# Patient Record
Sex: FEMALE | Race: WHITE | NOT HISPANIC OR LATINO | URBAN - NONMETROPOLITAN AREA
[De-identification: names, ages, dates, MRNs, and addresses within clinical notes are randomized per-mention and may not be internally consistent; named-entity substitution may affect disease eponyms.]

---

## 2021-07-01 ENCOUNTER — IMPORTED ENCOUNTER (OUTPATIENT)
Dept: URBAN - NONMETROPOLITAN AREA CLINIC 1 | Facility: CLINIC | Age: 71
End: 2021-07-01

## 2021-07-01 PROBLEM — H52.4: Noted: 2021-07-01

## 2021-07-01 PROBLEM — H25.813: Noted: 2021-07-01

## 2021-07-01 PROBLEM — H43.813: Noted: 2021-07-01

## 2021-07-01 PROBLEM — H35.3132: Noted: 2021-07-01

## 2021-07-01 PROBLEM — H52.13: Noted: 2021-07-01

## 2021-07-01 PROBLEM — H10.413: Noted: 2021-07-01

## 2021-07-01 PROCEDURE — 92015 DETERMINE REFRACTIVE STATE: CPT

## 2021-07-01 PROCEDURE — 92134 CPTRZ OPH DX IMG PST SGM RTA: CPT

## 2021-07-01 PROCEDURE — 99204 OFFICE O/P NEW MOD 45 MIN: CPT

## 2021-07-01 NOTE — PATIENT DISCUSSION
Cataract(s)-Visually significant.-Cataract(s) causing symptomatic impairment of visual function not correctable with a tolerable change in glasses or contact lenses lighting or non-operative means resulting in specific activity limitations and/or participation restrictions including but not limited to reading viewing television driving or meeting vocational or recreational needs. -Expectation is clearer vision and reduced glare disability after cataract removal.-Refer to Dr Evan Wolfe for cataract evaluation pt will try steroid at this time PVD-Retina flat 360 with no breaks tears or heme.-S&S of RD/RT reviewed with pt.-Stressed that pt should contact our office right away with any changes or increase in symptoms. AMD - dry-Explained dry AMD and advised that there are no treatments available at this time.-Continue AREDS 2 MVT. -Pt is to contact our office if any changes are noted. -Order OCT MAC today performed and reviewed w/pt GPC Discussed in detail w/pt discussed steroid tx but would only help while using steroiddiscussed cataract surgery in hopes of not having to use hard lens afterpt elects to try steroid tx for 3 wks discussed punctal occlusion to reduce amt of steroid absorbed by the bodySTART prednisolone tid OD until seen written Rx given Instructed pt to instill drops while CL is out recommend pt reduce wearing time; Dr's Notes: Jailene

## 2021-07-30 ENCOUNTER — IMPORTED ENCOUNTER (OUTPATIENT)
Dept: URBAN - NONMETROPOLITAN AREA CLINIC 1 | Facility: CLINIC | Age: 71
End: 2021-07-30

## 2021-07-30 PROBLEM — H10.413: Noted: 2021-07-30

## 2021-07-30 PROBLEM — H43.813: Noted: 2021-07-30

## 2021-07-30 PROBLEM — H25.813: Noted: 2021-07-30

## 2021-07-30 PROBLEM — H52.4: Noted: 2021-07-01

## 2021-07-30 PROBLEM — H35.3132: Noted: 2021-07-30

## 2021-07-30 PROBLEM — H52.13: Noted: 2021-07-01

## 2021-07-30 PROCEDURE — 99213 OFFICE O/P EST LOW 20 MIN: CPT

## 2021-07-30 NOTE — PATIENT DISCUSSION
GPC-Symptomatic improvement but little improvement in clinical picture-Long discussion about short term vs long term management-Can continue PRED BID OD QD OS (refills sent)-Can refit CL but not a long term solution-Best to address visual complaints with cataract surgery and get out of RGPsCataract(s)-Visually significant.-Cataract(s) causing symptomatic impairment of visual function not correctable with a tolerable change in glasses or contact lenses lighting or non-operative means resulting in specific activity limitations and/or participation restrictions including but not limited to reading viewing television driving or meeting vocational or recreational needs. -Expectation is clearer vision and reduced glare disability after cataract removal.-Refer to Dr Dilan Beck for cataract evaluationPVD-Retina flat 360 with no breaks tears or heme.-S&S of RD/RT reviewed with pt.-Stressed that pt should contact our office right away with any changes or increase in symptoms. AMD - dry-Explained dry AMD and advised that there are no treatments available at this time.-Continue AREDS 2 MVT. -Pt is to contact our office if any changes are noted.; 's Notes: Jailene

## 2021-09-24 ENCOUNTER — IMPORTED ENCOUNTER (OUTPATIENT)
Dept: URBAN - NONMETROPOLITAN AREA CLINIC 1 | Facility: CLINIC | Age: 71
End: 2021-09-24

## 2021-09-24 PROBLEM — H25.813: Noted: 2021-09-24

## 2021-09-24 PROBLEM — H43.813: Noted: 2021-09-24

## 2021-09-24 PROBLEM — H35.3132: Noted: 2021-09-24

## 2021-09-24 PROCEDURE — 99214 OFFICE O/P EST MOD 30 MIN: CPT

## 2021-09-24 NOTE — PATIENT DISCUSSION
Cataract(s)-Visually significant cataract OU .-Cataract(s) causing symptomatic impairment of visual function not correctable with a tolerable change in glasses or contact lenses lighting or non-operative means resulting in specific activity limitations and/or participation restrictions including but not limited to reading viewing television driving or meeting vocational or recreational needs. -Expectation is clearer vision and functional improvement in symptoms as well as reduced glare disability after cataract removal.-Order IOLMaster and OPD today. -Recommend Vivity Toric OU  based on today's OPD testing and lifestyle questionnaire.-All questions were answered regarding surgery including pre and post-op medications appointments activity restrictions and anesthetic usage.-The risks benefits and alternatives and special risk factors for the patient were discussed in detail including but not limited to: bleeding infection retinal detachment vitreous loss problems with the implant and possible need for additional surgery.-Although rare the possibility of complete vision loss was discussed.-The possible need for glasses post-operatively was discussed.-Order medical clearance exam based on history of allergies & pts age -Patient elects to proceed with cataract surgery OD . Will schedule at patient's convenience and re-evaluate OS  in the future. Discussed lens today Qualifies for Toric OU and elects Vivity. Discussed the [address] @ distance near and intermediate. Explained need for mild otc readers @ time. Pt elects LenSx OU Post op inflammation anticipated discussed dextenza insertion after surgery. 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 No treatment needed @ this time. She expressed understanding.  Continue to monitor for now.; 's Notes: Jailene

## 2021-10-11 ENCOUNTER — IMPORTED ENCOUNTER (OUTPATIENT)
Dept: URBAN - NONMETROPOLITAN AREA CLINIC 1 | Facility: CLINIC | Age: 71
End: 2021-10-11

## 2021-10-11 PROBLEM — H43.813: Noted: 2021-10-11

## 2021-10-11 PROBLEM — Z01.818: Noted: 2021-10-11

## 2021-10-11 PROBLEM — H25.813: Noted: 2021-10-11

## 2021-10-11 PROBLEM — H35.3132: Noted: 2021-10-11

## 2021-10-26 PROBLEM — H43.813: Noted: 2021-10-26

## 2021-10-26 PROBLEM — H35.3132: Noted: 2021-10-26

## 2021-10-26 PROBLEM — H25.813: Noted: 2021-10-26

## 2021-10-26 PROBLEM — Z01.818: Noted: 2021-10-26

## 2021-11-09 ENCOUNTER — IMPORTED ENCOUNTER (OUTPATIENT)
Dept: URBAN - NONMETROPOLITAN AREA CLINIC 1 | Facility: CLINIC | Age: 71
End: 2021-11-09

## 2021-11-09 PROBLEM — Z01.818: Noted: 2021-11-15

## 2021-11-09 PROBLEM — Z98.41: Noted: 2021-11-09

## 2021-11-09 PROBLEM — H43.813: Noted: 2021-10-11

## 2021-11-09 PROBLEM — H35.3132: Noted: 2021-10-11

## 2021-11-09 PROBLEM — I10: Noted: 2021-11-15

## 2021-11-09 PROBLEM — H25.812: Noted: 2021-11-09

## 2021-11-09 PROCEDURE — 99024 POSTOP FOLLOW-UP VISIT: CPT

## 2021-11-09 NOTE — PATIENT DISCUSSION
s/p PCIOL Vivity Toric/LenSx IOL OD 11/8/21 -JS-No dextenza will script PRED 1% on a 4 week taper-Pt doing well s/p PCIOL. -Continue post-op gtts according to instruction sheet and sleep with eye shield over eye for 7 nights.-Avoid bending at the waist lifting anything over 5lbs and dirty or sara environments. -RTC 1 wk POV OD/Reeval OS with PVM . ; 's Notes: Jailene

## 2021-11-15 ENCOUNTER — IMPORTED ENCOUNTER (OUTPATIENT)
Dept: URBAN - NONMETROPOLITAN AREA CLINIC 1 | Facility: CLINIC | Age: 71
End: 2021-11-15

## 2021-11-15 PROCEDURE — 99024 POSTOP FOLLOW-UP VISIT: CPT

## 2021-11-15 NOTE — PATIENT DISCUSSION
Cataract(s)-Visually significant cataract OS . -Cataract(s) causing symptomatic impairment of visual function not correctable with a tolerable change in glasses or contact lenses lighting or non-operative means resulting in specific activity limitations and/or participation restrictions including but not limited to reading viewing television driving or meeting vocational or recreational needs. -Expectation is clearer vision and functional improvement in symptoms as well as reduced glare disability after cataract removal.-Recommend Vivity Toric IOL /Lensx based on previous OPD testing and lifestyle questionnaire.-All questions were answered regarding surgery including pre and post-op medications appointments activity restrictions and anesthetic usage.-The risks benefits and alternatives and special risk factors for the patient were discussed in detail including but not limited to: bleeding infection retinal detachment vitreous loss problems with the implant and possible need for additional surgery.-Although rare the possibility of complete vision loss was discussed.-The need for glasses post-operatively was discussed.-Patient elects to proceed with cataract surgery OS . -START refresh relieva QID OS until return-Need Ian OS s/p PCIOL-Pt doing well at 1 week s/p PCIOL. -Continue post-op gtts according to instruction sheet.-Okay to resume usual activites and d/c eye shield.; 's Notes: Jailene

## 2021-11-15 NOTE — PATIENT DISCUSSION
Medical Clearance-Medical clearance done today. -No outstanding concerns that would preclude surgery.-Patient is cleared to proceed with scheduled surgery.; 's Notes: Jailene

## 2021-12-06 ENCOUNTER — IMPORTED ENCOUNTER (OUTPATIENT)
Dept: URBAN - NONMETROPOLITAN AREA CLINIC 1 | Facility: CLINIC | Age: 71
End: 2021-12-06

## 2021-12-06 NOTE — PATIENT DISCUSSION
Cataract(s)-Visually significant cataract OS . -Cataract(s) causing symptomatic impairment of visual function not correctable with a tolerable change in glasses or contact lenses lighting or non-operative means resulting in specific activity limitations and/or participation restrictions including but not limited to reading viewing television driving or meeting vocational or recreational needs. -Expectation is clearer vision and functional improvement in symptoms as well as reduced glare disability after cataract removal.-Recommend Vivity Toric IOL /Lensx based on previous OPD testing and lifestyle questionnaire.-All questions were answered regarding surgery including pre and post-op medications appointments activity restrictions and anesthetic usage.-The risks benefits and alternatives and special risk factors for the patient were discussed in detail including but not limited to: bleeding infection retinal detachment vitreous loss problems with the implant and possible need for additional surgery.-Although rare the possibility of complete vision loss was discussed.-The need for glasses post-operatively was discussed.-Patient elects to proceed with cataract surgery OS . -START refresh relieva QID OS until returns/p PCIOL-Pt doing well at 1 week s/p PCIOL. -Continue post-op gtts according to instruction sheet.-Okay to resume usual activites and d/c eye shield.; 's Notes: Jailene

## 2021-12-14 ENCOUNTER — IMPORTED ENCOUNTER (OUTPATIENT)
Dept: URBAN - NONMETROPOLITAN AREA CLINIC 1 | Facility: CLINIC | Age: 71
End: 2021-12-14

## 2021-12-14 PROBLEM — Z96.1: Noted: 2022-01-05

## 2021-12-14 PROBLEM — Z98.41: Noted: 2021-12-14

## 2021-12-14 PROBLEM — H35.3132: Noted: 2021-10-11

## 2021-12-14 PROBLEM — H43.813: Noted: 2021-10-11

## 2021-12-14 PROBLEM — H26.493: Noted: 2022-01-05

## 2021-12-14 PROBLEM — Z98.42: Noted: 2021-12-14

## 2021-12-14 PROCEDURE — 99024 POSTOP FOLLOW-UP VISIT: CPT

## 2021-12-14 NOTE — PATIENT DISCUSSION
s/p PCIOL OS-Pt doing well s/p PCIOL. -Continue post-op gtts according to instruction sheet and sleep with eye shield over eye for 7 nights.-Avoid bending at the waist lifting anything over 5lbs and dirty or sara environments. -RTC 1 week POVs/p PCIOL OD-Pt doing well at 3 weeks s/p PCIOL. -Continue post-op gtts according to instruction sheet.; 's Notes: Giuliano Tran

## 2021-12-20 ENCOUNTER — IMPORTED ENCOUNTER (OUTPATIENT)
Dept: URBAN - NONMETROPOLITAN AREA CLINIC 1 | Facility: CLINIC | Age: 71
End: 2021-12-20

## 2021-12-20 PROCEDURE — 99024 POSTOP FOLLOW-UP VISIT: CPT

## 2021-12-20 NOTE — PATIENT DISCUSSION
s/p PCIOL OS-Pt doing well s/p PCIOL. -Continue post-op gtts according to instruction sheet-RTC 2 week POV w/refraction-Can consider VUITYs/p PCIOL OD-Pt doing well at 4 weeks s/p PCIOL. -Continue post-op gtts according to instruction sheet. AMD/PVD OU-Will need long term monitoringA/R TODAY:  -0.25+0.50 x 153           -1.00+0.50 x 001; 's Notes: Jailene

## 2022-01-05 ENCOUNTER — PREPPED CHART (OUTPATIENT)
Dept: RURAL CLINIC 2 | Facility: CLINIC | Age: 72
End: 2022-01-05

## 2022-01-05 ENCOUNTER — IMPORTED ENCOUNTER (OUTPATIENT)
Dept: URBAN - NONMETROPOLITAN AREA CLINIC 1 | Facility: CLINIC | Age: 72
End: 2022-01-05

## 2022-01-05 PROCEDURE — 99024 POSTOP FOLLOW-UP VISIT: CPT

## 2022-01-05 NOTE — PATIENT DISCUSSION
s/p PCIOL OS-Pt doing well s/p PCIOL. -Continue post-op gtts according to instruction sheet-Continue OTC readers PRN s/p PCIOL OD-Pt doing well s/p PCIOL. AMD/PVD OU-Order OCT MAC at next visit; 's Notes: Connie Mcneil

## 2022-04-06 ASSESSMENT — VISUAL ACUITY
OD_SC: 20/20
OS_SC: 20/30-2

## 2022-04-06 ASSESSMENT — TONOMETRY
OS_IOP_MMHG: 15
OD_IOP_MMHG: 15

## 2022-04-07 ENCOUNTER — ESTABLISHED PATIENT (OUTPATIENT)
Dept: RURAL CLINIC 2 | Facility: CLINIC | Age: 72
End: 2022-04-07

## 2022-04-07 DIAGNOSIS — H26.493: ICD-10-CM

## 2022-04-07 DIAGNOSIS — H35.3132: ICD-10-CM

## 2022-04-07 DIAGNOSIS — Z96.1: ICD-10-CM

## 2022-04-07 DIAGNOSIS — H43.813: ICD-10-CM

## 2022-04-07 DIAGNOSIS — H35.372: ICD-10-CM

## 2022-04-07 PROCEDURE — 99214 OFFICE O/P EST MOD 30 MIN: CPT

## 2022-04-07 PROCEDURE — 92134 CPTRZ OPH DX IMG PST SGM RTA: CPT

## 2022-04-07 ASSESSMENT — TONOMETRY
OD_IOP_MMHG: 17
OS_IOP_MMHG: 15

## 2022-04-07 ASSESSMENT — VISUAL ACUITY
OS_PH: 20/30+3
OD_SC: 20/20
OS_SC: 20/40

## 2022-04-15 ASSESSMENT — VISUAL ACUITY
OS_CC: 20/80-2
OS_CC: 20/40-1
OS_CC: 20/40+2
OD_SC: 20/100
OD_CC: 20/20
OD_PH: 20/30
OS_SC: 20/30
OS_AM: 20/20
OS_GLARE: 20/30
OS_SC: 20/30-1
OD_SC: 20/40-2
OS_CC: 20/40+3
OD_SC: 20/200
OS_AM: 20/25
OU_CC: 20/30
OS_GLARE: 20/200
OS_CC: 20/30-2
OS_CC: 20/100
OD_PAM: 20/20
OS_SC: 20/30
OS_PH: 20/40-1
OD_SC: 20/100-1
OD_GLARE: 20/40
OD_SC: 20/40
OD_PH: 20/30
OS_CC: 20/200
OS_GLARE: 20/100
OD_CC: 20/25-2
OD_CC: 20/20-2
OS_SC: 20/30-2
OS_CC: 20/100
OS_PH: 20/20
OU_SC: 20/30
OD_PH: 20/30+2
OD_CC: 20/20-1
OU_SC: 20/25
OD_CC: 20/20
OD_CC: 20/30-1
OS_PH: 20/60-1
OU_CC: 20/80
OD_SC: 20/100
OU_CC: 20/40-1
OS_SC: 20/50
OS_AM: 20/20
OD_CC: 20/25-2

## 2022-04-15 ASSESSMENT — TONOMETRY
OD_IOP_MMHG: 16
OS_IOP_MMHG: 16
OS_IOP_MMHG: 15
OS_IOP_MMHG: 15
OD_IOP_MMHG: 15
OD_IOP_MMHG: 15
OS_IOP_MMHG: 16
OD_IOP_MMHG: 17
OS_IOP_MMHG: 15
OS_IOP_MMHG: 16
OS_IOP_MMHG: 17
OS_IOP_MMHG: 16
OS_IOP_MMHG: 15
OD_IOP_MMHG: 15
OD_IOP_MMHG: 16
OD_IOP_MMHG: 17

## 2022-11-16 ENCOUNTER — ESTABLISHED PATIENT (OUTPATIENT)
Dept: RURAL CLINIC 2 | Facility: CLINIC | Age: 72
End: 2022-11-16

## 2022-11-16 DIAGNOSIS — Z96.1: ICD-10-CM

## 2022-11-16 DIAGNOSIS — H35.372: ICD-10-CM

## 2022-11-16 DIAGNOSIS — H43.813: ICD-10-CM

## 2022-11-16 DIAGNOSIS — H35.3132: ICD-10-CM

## 2022-11-16 DIAGNOSIS — H26.493: ICD-10-CM

## 2022-11-16 PROCEDURE — 92014 COMPRE OPH EXAM EST PT 1/>: CPT

## 2022-11-16 PROCEDURE — 92134 CPTRZ OPH DX IMG PST SGM RTA: CPT

## 2022-11-16 ASSESSMENT — TONOMETRY
OS_IOP_MMHG: 15
OD_IOP_MMHG: 17

## 2022-11-16 ASSESSMENT — VISUAL ACUITY
OD_SC: 20/25
OS_PH: 20/25
OS_SC: 20/40

## 2024-01-04 ENCOUNTER — ESTABLISHED PATIENT (OUTPATIENT)
Dept: RURAL CLINIC 2 | Facility: CLINIC | Age: 74
End: 2024-01-04

## 2024-01-04 DIAGNOSIS — H43.813: ICD-10-CM

## 2024-01-04 DIAGNOSIS — Z96.1: ICD-10-CM

## 2024-01-04 DIAGNOSIS — H35.372: ICD-10-CM

## 2024-01-04 DIAGNOSIS — H26.493: ICD-10-CM

## 2024-01-04 DIAGNOSIS — H52.4: ICD-10-CM

## 2024-01-04 DIAGNOSIS — H35.3132: ICD-10-CM

## 2024-01-04 PROCEDURE — 99214 OFFICE O/P EST MOD 30 MIN: CPT

## 2024-01-04 PROCEDURE — 92015 DETERMINE REFRACTIVE STATE: CPT

## 2024-01-04 PROCEDURE — 92134 CPTRZ OPH DX IMG PST SGM RTA: CPT

## 2024-01-04 ASSESSMENT — TONOMETRY
OD_IOP_MMHG: 17
OS_IOP_MMHG: 17

## 2024-01-04 ASSESSMENT — VISUAL ACUITY
OD_SC: 20/40+
OD_PH: 20/30
OS_SC: 20/70
OS_PH: 20/30+

## 2024-07-22 ENCOUNTER — EMERGENCY VISIT (OUTPATIENT)
Dept: RURAL CLINIC 2 | Facility: CLINIC | Age: 74
End: 2024-07-22

## 2024-07-22 DIAGNOSIS — H26.493: ICD-10-CM

## 2024-07-22 DIAGNOSIS — Z96.1: ICD-10-CM

## 2024-07-22 DIAGNOSIS — H16.142: ICD-10-CM

## 2024-07-22 DIAGNOSIS — H35.372: ICD-10-CM

## 2024-07-22 DIAGNOSIS — H35.3132: ICD-10-CM

## 2024-07-22 DIAGNOSIS — H43.813: ICD-10-CM

## 2024-07-22 PROCEDURE — 92285 EXTERNAL OCULAR PHOTOGRAPHY: CPT

## 2024-07-22 PROCEDURE — 99213 OFFICE O/P EST LOW 20 MIN: CPT

## 2024-07-22 ASSESSMENT — VISUAL ACUITY
OD_SC: 20/25+
OS_SC: 20/80
OS_PH: 20/30+

## 2024-07-25 ENCOUNTER — FOLLOW UP (OUTPATIENT)
Dept: RURAL CLINIC 2 | Facility: CLINIC | Age: 74
End: 2024-07-25

## 2024-07-25 DIAGNOSIS — H16.142: ICD-10-CM

## 2024-07-25 DIAGNOSIS — H35.372: ICD-10-CM

## 2024-07-25 DIAGNOSIS — H43.813: ICD-10-CM

## 2024-07-25 DIAGNOSIS — H26.493: ICD-10-CM

## 2024-07-25 DIAGNOSIS — Z96.1: ICD-10-CM

## 2024-07-25 DIAGNOSIS — H35.3132: ICD-10-CM

## 2024-07-25 PROCEDURE — 99213 OFFICE O/P EST LOW 20 MIN: CPT

## 2024-07-25 ASSESSMENT — VISUAL ACUITY
OD_SC: 20/25-1
OS_PH: 20/40
OS_SC: 20/70

## 2024-07-25 ASSESSMENT — TONOMETRY
OS_IOP_MMHG: 16
OD_IOP_MMHG: 16

## 2025-01-09 ENCOUNTER — COMPREHENSIVE EXAM (OUTPATIENT)
Age: 75
End: 2025-01-09

## 2025-01-09 DIAGNOSIS — H43.813: ICD-10-CM

## 2025-01-09 DIAGNOSIS — H35.3132: ICD-10-CM

## 2025-01-09 DIAGNOSIS — H16.142: ICD-10-CM

## 2025-01-09 DIAGNOSIS — H35.372: ICD-10-CM

## 2025-01-09 DIAGNOSIS — H52.13: ICD-10-CM

## 2025-01-09 DIAGNOSIS — H52.4: ICD-10-CM

## 2025-01-09 PROCEDURE — 92134 CPTRZ OPH DX IMG PST SGM RTA: CPT

## 2025-01-09 PROCEDURE — 92015 DETERMINE REFRACTIVE STATE: CPT

## 2025-01-09 PROCEDURE — 92014 COMPRE OPH EXAM EST PT 1/>: CPT

## 2025-04-21 ENCOUNTER — FOLLOW UP (OUTPATIENT)
Age: 75
End: 2025-04-21

## 2025-04-21 DIAGNOSIS — H26.493: ICD-10-CM

## 2025-04-21 DIAGNOSIS — H43.813: ICD-10-CM

## 2025-04-21 DIAGNOSIS — Z96.1: ICD-10-CM

## 2025-04-21 DIAGNOSIS — H16.142: ICD-10-CM

## 2025-04-21 DIAGNOSIS — H35.372: ICD-10-CM

## 2025-04-21 DIAGNOSIS — H35.3132: ICD-10-CM

## 2025-04-21 PROCEDURE — 92014 COMPRE OPH EXAM EST PT 1/>: CPT

## 2025-04-21 PROCEDURE — 92134 CPTRZ OPH DX IMG PST SGM RTA: CPT
